# Patient Record
Sex: FEMALE
[De-identification: names, ages, dates, MRNs, and addresses within clinical notes are randomized per-mention and may not be internally consistent; named-entity substitution may affect disease eponyms.]

---

## 2020-11-11 ENCOUNTER — RESULT REVIEW (OUTPATIENT)
Age: 30
End: 2020-11-11

## 2021-04-05 ENCOUNTER — APPOINTMENT (OUTPATIENT)
Dept: ANTEPARTUM | Facility: CLINIC | Age: 31
End: 2021-04-05
Payer: COMMERCIAL

## 2021-04-05 ENCOUNTER — ASOB RESULT (OUTPATIENT)
Age: 31
End: 2021-04-05

## 2021-04-05 PROCEDURE — 76816 OB US FOLLOW-UP PER FETUS: CPT

## 2021-04-05 PROCEDURE — 76819 FETAL BIOPHYS PROFIL W/O NST: CPT

## 2021-04-05 PROCEDURE — 99072 ADDL SUPL MATRL&STAF TM PHE: CPT

## 2021-04-06 ENCOUNTER — TRANSCRIPTION ENCOUNTER (OUTPATIENT)
Age: 31
End: 2021-04-06

## 2021-05-03 ENCOUNTER — ASOB RESULT (OUTPATIENT)
Age: 31
End: 2021-05-03

## 2021-05-03 ENCOUNTER — APPOINTMENT (OUTPATIENT)
Dept: ANTEPARTUM | Facility: CLINIC | Age: 31
End: 2021-05-03
Payer: COMMERCIAL

## 2021-05-03 PROCEDURE — 99072 ADDL SUPL MATRL&STAF TM PHE: CPT

## 2021-05-03 PROCEDURE — 76816 OB US FOLLOW-UP PER FETUS: CPT

## 2021-05-03 PROCEDURE — 76819 FETAL BIOPHYS PROFIL W/O NST: CPT

## 2021-06-01 ENCOUNTER — ASOB RESULT (OUTPATIENT)
Age: 31
End: 2021-06-01

## 2021-06-01 ENCOUNTER — APPOINTMENT (OUTPATIENT)
Dept: ANTEPARTUM | Facility: CLINIC | Age: 31
End: 2021-06-01
Payer: COMMERCIAL

## 2021-06-01 PROCEDURE — 76816 OB US FOLLOW-UP PER FETUS: CPT

## 2021-06-01 PROCEDURE — 99072 ADDL SUPL MATRL&STAF TM PHE: CPT

## 2021-06-01 PROCEDURE — 76817 TRANSVAGINAL US OBSTETRIC: CPT

## 2021-06-08 ENCOUNTER — ASOB RESULT (OUTPATIENT)
Age: 31
End: 2021-06-08

## 2021-06-08 ENCOUNTER — APPOINTMENT (OUTPATIENT)
Dept: ANTEPARTUM | Facility: CLINIC | Age: 31
End: 2021-06-08
Payer: COMMERCIAL

## 2021-06-08 PROCEDURE — 76818 FETAL BIOPHYS PROFILE W/NST: CPT

## 2021-06-08 PROCEDURE — 76816 OB US FOLLOW-UP PER FETUS: CPT

## 2021-06-08 PROCEDURE — 99072 ADDL SUPL MATRL&STAF TM PHE: CPT

## 2021-06-15 ENCOUNTER — APPOINTMENT (OUTPATIENT)
Dept: ANTEPARTUM | Facility: CLINIC | Age: 31
End: 2021-06-15
Payer: COMMERCIAL

## 2021-06-15 ENCOUNTER — ASOB RESULT (OUTPATIENT)
Age: 31
End: 2021-06-15

## 2021-06-15 PROCEDURE — 76816 OB US FOLLOW-UP PER FETUS: CPT

## 2021-06-15 PROCEDURE — 99072 ADDL SUPL MATRL&STAF TM PHE: CPT

## 2021-06-15 PROCEDURE — 76819 FETAL BIOPHYS PROFIL W/O NST: CPT

## 2021-06-22 ENCOUNTER — ASOB RESULT (OUTPATIENT)
Age: 31
End: 2021-06-22

## 2021-06-22 ENCOUNTER — APPOINTMENT (OUTPATIENT)
Dept: ANTEPARTUM | Facility: CLINIC | Age: 31
End: 2021-06-22
Payer: COMMERCIAL

## 2021-06-22 PROCEDURE — 99072 ADDL SUPL MATRL&STAF TM PHE: CPT

## 2021-06-22 PROCEDURE — 76816 OB US FOLLOW-UP PER FETUS: CPT

## 2021-06-22 PROCEDURE — 76819 FETAL BIOPHYS PROFIL W/O NST: CPT

## 2021-06-29 ENCOUNTER — APPOINTMENT (OUTPATIENT)
Dept: ANTEPARTUM | Facility: CLINIC | Age: 31
End: 2021-06-29
Payer: COMMERCIAL

## 2021-06-29 ENCOUNTER — ASOB RESULT (OUTPATIENT)
Age: 31
End: 2021-06-29

## 2021-06-29 PROCEDURE — 76816 OB US FOLLOW-UP PER FETUS: CPT

## 2021-06-29 PROCEDURE — 99072 ADDL SUPL MATRL&STAF TM PHE: CPT

## 2021-06-29 PROCEDURE — 76819 FETAL BIOPHYS PROFIL W/O NST: CPT

## 2021-07-02 ENCOUNTER — INPATIENT (INPATIENT)
Facility: HOSPITAL | Age: 31
LOS: 2 days | Discharge: HOME | End: 2021-07-05
Attending: OBSTETRICS & GYNECOLOGY | Admitting: OBSTETRICS & GYNECOLOGY
Payer: COMMERCIAL

## 2021-07-02 VITALS — WEIGHT: 119.05 LBS | HEIGHT: 61 IN

## 2021-07-02 LAB
BASOPHILS # BLD AUTO: 0.02 K/UL — SIGNIFICANT CHANGE UP (ref 0–0.2)
BASOPHILS NFR BLD AUTO: 0.3 % — SIGNIFICANT CHANGE UP (ref 0–2)
BLD GP AB SCN SERPL QL: NEGATIVE — SIGNIFICANT CHANGE UP
COVID-19 SPIKE DOMAIN AB INTERP: POSITIVE
COVID-19 SPIKE DOMAIN ANTIBODY RESULT: >250 U/ML — HIGH
EOSINOPHIL # BLD AUTO: 0.05 K/UL — SIGNIFICANT CHANGE UP (ref 0–0.5)
EOSINOPHIL NFR BLD AUTO: 0.6 % — SIGNIFICANT CHANGE UP (ref 0–6)
HCT VFR BLD CALC: 32.1 % — LOW (ref 34.5–45)
HGB BLD-MCNC: 10.8 G/DL — LOW (ref 11.5–15.5)
IMM GRANULOCYTES NFR BLD AUTO: 0.9 % — SIGNIFICANT CHANGE UP (ref 0–1.5)
LYMPHOCYTES # BLD AUTO: 1.72 K/UL — SIGNIFICANT CHANGE UP (ref 1–3.3)
LYMPHOCYTES # BLD AUTO: 22.3 % — SIGNIFICANT CHANGE UP (ref 13–44)
MCHC RBC-ENTMCNC: 29 PG — SIGNIFICANT CHANGE UP (ref 27–34)
MCHC RBC-ENTMCNC: 33.6 GM/DL — SIGNIFICANT CHANGE UP (ref 32–36)
MCV RBC AUTO: 86.1 FL — SIGNIFICANT CHANGE UP (ref 80–100)
MONOCYTES # BLD AUTO: 0.52 K/UL — SIGNIFICANT CHANGE UP (ref 0–0.9)
MONOCYTES NFR BLD AUTO: 6.7 % — SIGNIFICANT CHANGE UP (ref 2–14)
NEUTROPHILS # BLD AUTO: 5.34 K/UL — SIGNIFICANT CHANGE UP (ref 1.8–7.4)
NEUTROPHILS NFR BLD AUTO: 69.2 % — SIGNIFICANT CHANGE UP (ref 43–77)
NRBC # BLD: 0 /100 WBCS — SIGNIFICANT CHANGE UP (ref 0–0)
PLATELET # BLD AUTO: 177 K/UL — SIGNIFICANT CHANGE UP (ref 150–400)
RBC # BLD: 3.73 M/UL — LOW (ref 3.8–5.2)
RBC # FLD: 15.9 % — HIGH (ref 10.3–14.5)
RH IG SCN BLD-IMP: POSITIVE — SIGNIFICANT CHANGE UP
RH IG SCN BLD-IMP: POSITIVE — SIGNIFICANT CHANGE UP
SARS-COV-2 IGG+IGM SERPL QL IA: >250 U/ML — HIGH
SARS-COV-2 IGG+IGM SERPL QL IA: POSITIVE
SARS-COV-2 RNA SPEC QL NAA+PROBE: SIGNIFICANT CHANGE UP
T PALLIDUM AB TITR SER: NEGATIVE — SIGNIFICANT CHANGE UP
WBC # BLD: 7.72 K/UL — SIGNIFICANT CHANGE UP (ref 3.8–10.5)
WBC # FLD AUTO: 7.72 K/UL — SIGNIFICANT CHANGE UP (ref 3.8–10.5)

## 2021-07-02 RX ORDER — AMPICILLIN TRIHYDRATE 250 MG
1 CAPSULE ORAL EVERY 4 HOURS
Refills: 0 | Status: DISCONTINUED | OUTPATIENT
Start: 2021-07-02 | End: 2021-07-03

## 2021-07-02 RX ORDER — OXYTOCIN 10 UNIT/ML
333.33 VIAL (ML) INJECTION
Qty: 20 | Refills: 0 | Status: COMPLETED | OUTPATIENT
Start: 2021-07-02 | End: 2021-07-02

## 2021-07-02 RX ORDER — AMPICILLIN TRIHYDRATE 250 MG
2 CAPSULE ORAL ONCE
Refills: 0 | Status: COMPLETED | OUTPATIENT
Start: 2021-07-02 | End: 2021-07-02

## 2021-07-02 RX ORDER — OXYTOCIN 10 UNIT/ML
2 VIAL (ML) INJECTION
Qty: 30 | Refills: 0 | Status: DISCONTINUED | OUTPATIENT
Start: 2021-07-02 | End: 2021-07-05

## 2021-07-02 RX ORDER — SODIUM CHLORIDE 9 MG/ML
1000 INJECTION, SOLUTION INTRAVENOUS
Refills: 0 | Status: DISCONTINUED | OUTPATIENT
Start: 2021-07-02 | End: 2021-07-03

## 2021-07-02 RX ORDER — FENTANYL/BUPIVACAINE/NS/PF 2MCG/ML-.1
250 PLASTIC BAG, INJECTION (ML) INJECTION
Refills: 0 | Status: DISCONTINUED | OUTPATIENT
Start: 2021-07-02 | End: 2021-07-02

## 2021-07-02 RX ADMIN — Medication 108 GRAM(S): at 17:10

## 2021-07-02 RX ADMIN — SODIUM CHLORIDE 125 MILLILITER(S): 9 INJECTION, SOLUTION INTRAVENOUS at 20:22

## 2021-07-02 RX ADMIN — Medication 108 GRAM(S): at 21:15

## 2021-07-02 RX ADMIN — Medication 2 MILLIUNIT(S)/MIN: at 14:30

## 2021-07-02 RX ADMIN — Medication 216 GRAM(S): at 11:30

## 2021-07-03 ENCOUNTER — RESULT REVIEW (OUTPATIENT)
Age: 31
End: 2021-07-03

## 2021-07-03 PROCEDURE — 88307 TISSUE EXAM BY PATHOLOGIST: CPT | Mod: 26

## 2021-07-03 RX ORDER — SIMETHICONE 80 MG/1
80 TABLET, CHEWABLE ORAL EVERY 4 HOURS
Refills: 0 | Status: DISCONTINUED | OUTPATIENT
Start: 2021-07-03 | End: 2021-07-05

## 2021-07-03 RX ORDER — IBUPROFEN 200 MG
600 TABLET ORAL EVERY 6 HOURS
Refills: 0 | Status: COMPLETED | OUTPATIENT
Start: 2021-07-03 | End: 2022-06-01

## 2021-07-03 RX ORDER — ACETAMINOPHEN 500 MG
975 TABLET ORAL
Refills: 0 | Status: DISCONTINUED | OUTPATIENT
Start: 2021-07-03 | End: 2021-07-05

## 2021-07-03 RX ORDER — MAGNESIUM HYDROXIDE 400 MG/1
30 TABLET, CHEWABLE ORAL
Refills: 0 | Status: DISCONTINUED | OUTPATIENT
Start: 2021-07-03 | End: 2021-07-05

## 2021-07-03 RX ORDER — KETOROLAC TROMETHAMINE 30 MG/ML
30 SYRINGE (ML) INJECTION ONCE
Refills: 0 | Status: DISCONTINUED | OUTPATIENT
Start: 2021-07-03 | End: 2021-07-03

## 2021-07-03 RX ORDER — TETANUS TOXOID, REDUCED DIPHTHERIA TOXOID AND ACELLULAR PERTUSSIS VACCINE, ADSORBED 5; 2.5; 8; 8; 2.5 [IU]/.5ML; [IU]/.5ML; UG/.5ML; UG/.5ML; UG/.5ML
0.5 SUSPENSION INTRAMUSCULAR ONCE
Refills: 0 | Status: DISCONTINUED | OUTPATIENT
Start: 2021-07-03 | End: 2021-07-05

## 2021-07-03 RX ORDER — BENZOCAINE 10 %
1 GEL (GRAM) MUCOUS MEMBRANE EVERY 6 HOURS
Refills: 0 | Status: DISCONTINUED | OUTPATIENT
Start: 2021-07-03 | End: 2021-07-05

## 2021-07-03 RX ORDER — OXYTOCIN 10 UNIT/ML
333.33 VIAL (ML) INJECTION
Qty: 20 | Refills: 0 | Status: DISCONTINUED | OUTPATIENT
Start: 2021-07-03 | End: 2021-07-05

## 2021-07-03 RX ORDER — FERROUS SULFATE 325(65) MG
325 TABLET ORAL EVERY 12 HOURS
Refills: 0 | Status: DISCONTINUED | OUTPATIENT
Start: 2021-07-03 | End: 2021-07-05

## 2021-07-03 RX ORDER — DIPHENHYDRAMINE HCL 50 MG
25 CAPSULE ORAL EVERY 6 HOURS
Refills: 0 | Status: DISCONTINUED | OUTPATIENT
Start: 2021-07-03 | End: 2021-07-05

## 2021-07-03 RX ORDER — OXYCODONE HYDROCHLORIDE 5 MG/1
5 TABLET ORAL
Refills: 0 | Status: DISCONTINUED | OUTPATIENT
Start: 2021-07-03 | End: 2021-07-05

## 2021-07-03 RX ORDER — FLUOXETINE HCL 10 MG
20 CAPSULE ORAL DAILY
Refills: 0 | Status: DISCONTINUED | OUTPATIENT
Start: 2021-07-03 | End: 2021-07-05

## 2021-07-03 RX ORDER — PRAMOXINE HYDROCHLORIDE 150 MG/15G
1 AEROSOL, FOAM RECTAL EVERY 4 HOURS
Refills: 0 | Status: DISCONTINUED | OUTPATIENT
Start: 2021-07-03 | End: 2021-07-05

## 2021-07-03 RX ORDER — IBUPROFEN 200 MG
600 TABLET ORAL EVERY 6 HOURS
Refills: 0 | Status: DISCONTINUED | OUTPATIENT
Start: 2021-07-03 | End: 2021-07-05

## 2021-07-03 RX ORDER — SODIUM CHLORIDE 9 MG/ML
3 INJECTION INTRAMUSCULAR; INTRAVENOUS; SUBCUTANEOUS EVERY 8 HOURS
Refills: 0 | Status: DISCONTINUED | OUTPATIENT
Start: 2021-07-03 | End: 2021-07-05

## 2021-07-03 RX ORDER — LANOLIN
1 OINTMENT (GRAM) TOPICAL EVERY 6 HOURS
Refills: 0 | Status: DISCONTINUED | OUTPATIENT
Start: 2021-07-03 | End: 2021-07-05

## 2021-07-03 RX ORDER — HYDROCORTISONE 1 %
1 OINTMENT (GRAM) TOPICAL EVERY 6 HOURS
Refills: 0 | Status: DISCONTINUED | OUTPATIENT
Start: 2021-07-03 | End: 2021-07-05

## 2021-07-03 RX ORDER — DIBUCAINE 1 %
1 OINTMENT (GRAM) RECTAL EVERY 6 HOURS
Refills: 0 | Status: DISCONTINUED | OUTPATIENT
Start: 2021-07-03 | End: 2021-07-05

## 2021-07-03 RX ORDER — AER TRAVELER 0.5 G/1
1 SOLUTION RECTAL; TOPICAL EVERY 4 HOURS
Refills: 0 | Status: DISCONTINUED | OUTPATIENT
Start: 2021-07-03 | End: 2021-07-05

## 2021-07-03 RX ORDER — OXYCODONE HYDROCHLORIDE 5 MG/1
5 TABLET ORAL ONCE
Refills: 0 | Status: DISCONTINUED | OUTPATIENT
Start: 2021-07-03 | End: 2021-07-05

## 2021-07-03 RX ADMIN — Medication 108 GRAM(S): at 02:31

## 2021-07-03 RX ADMIN — Medication 975 MILLIGRAM(S): at 15:23

## 2021-07-03 RX ADMIN — Medication 1 APPLICATION(S): at 09:18

## 2021-07-03 RX ADMIN — SODIUM CHLORIDE 3 MILLILITER(S): 9 INJECTION INTRAMUSCULAR; INTRAVENOUS; SUBCUTANEOUS at 22:00

## 2021-07-03 RX ADMIN — SODIUM CHLORIDE 3 MILLILITER(S): 9 INJECTION INTRAMUSCULAR; INTRAVENOUS; SUBCUTANEOUS at 12:21

## 2021-07-03 RX ADMIN — Medication 325 MILLIGRAM(S): at 21:51

## 2021-07-03 RX ADMIN — Medication 975 MILLIGRAM(S): at 10:00

## 2021-07-03 RX ADMIN — Medication 20 MILLIGRAM(S): at 12:21

## 2021-07-03 RX ADMIN — Medication 1 SPRAY(S): at 09:18

## 2021-07-03 RX ADMIN — Medication 30 MILLIGRAM(S): at 05:21

## 2021-07-03 RX ADMIN — Medication 600 MILLIGRAM(S): at 18:30

## 2021-07-03 RX ADMIN — Medication 600 MILLIGRAM(S): at 12:20

## 2021-07-03 RX ADMIN — Medication 1 TABLET(S): at 09:18

## 2021-07-03 RX ADMIN — Medication 1000 MILLIUNIT(S)/MIN: at 03:51

## 2021-07-03 RX ADMIN — Medication 975 MILLIGRAM(S): at 21:15

## 2021-07-03 RX ADMIN — Medication 600 MILLIGRAM(S): at 13:00

## 2021-07-03 RX ADMIN — Medication 975 MILLIGRAM(S): at 22:00

## 2021-07-03 RX ADMIN — Medication 975 MILLIGRAM(S): at 16:00

## 2021-07-03 RX ADMIN — Medication 600 MILLIGRAM(S): at 17:46

## 2021-07-03 RX ADMIN — Medication 975 MILLIGRAM(S): at 09:18

## 2021-07-03 NOTE — LACTATION INITIAL EVALUATION - NIPPLE ASSESSMENT (LEFT)
dense areolas, edematous , reverse pressure softening softens areola and nipple everts slightly/medium/flat/compressible

## 2021-07-03 NOTE — LACTATION INITIAL EVALUATION - NIPPLE ASSESSMENT (RIGHT)
areola also dense, reverse pressure softening softens areola and with stimualtion nipple everts slightly/medium/flat/compressible

## 2021-07-03 NOTE — LACTATION INITIAL EVALUATION - MILK SUPPLY
difficult to hand express due to pt.'s sensitivity , instructed pt. on use of hand pump although uncomfortable pt. able to pump approx. 3minutes and 0.6 ml of colostrum expressed/colostrum

## 2021-07-03 NOTE — PROVIDER CONTACT NOTE (OTHER) - BACKGROUND
S/p NSD on 7/3 at 0252, , 38.6 weeks gestation. Hx of anemia during pregancy; iron IV infusion 7x. H&H predelivery: 10.8/32.1

## 2021-07-03 NOTE — LACTATION INITIAL EVALUATION - LACTATION INTERVENTIONS
unable to sustain latch/initiate/review safe skin-to-skin/initiate/review hand expression/initiate/review pumping guidelines and safe milk handling/reverse pressure softening/initiate/review techniques for position and latch/initiate/review breast massage/compression/reviewed feeding on demand/by cue at least 8 times a day/reviewed indications of inadequate milk transfer that would require supplementation

## 2021-07-03 NOTE — LACTATION INITIAL EVALUATION - NS LACT CON REASON FOR REQ
Pt. is a p1 at 38.6 wks. gestation via vaginal delivery.  Pt has hx of depression/anxiety has used Prozac and stopped when pregnant resumed use of Prozac approx. 1 month ago.  Pt.'s areolas dense but with reverse pressure softening edema noted and areolas did soften slightly.  Instructed pt. on use of hand pump due to difficulty with hand expressing and pt. did state breasts are sensitive.  Pt. was able to tolerate hand pump for approx. 3 minutes and was able to express 0.6 ml of colostrum.  Colostrum was then fed via syringe to baby.   prioor to using hand pump attempted to latch baby to breast but baby too sleepy to latch.  Discussed with pt. possibility of triple feeding plan if baby remains to sleepy to latch or is not latching.  Pt. to be followed up by LC./primaparous mom Pt. is a p1 at 38.6 wks. gestation via vaginal delivery.  Pt has hx of depression/anxiety has used Prozac and stopped when pregnant resumed use of Prozac approx. 1 month ago.  Pt.'s areolas dense but with reverse pressure softening edema noted and areolas did soften slightly.  Instructed pt. on use of hand pump due to difficulty with hand expressing and pt. did state breasts are sensitive.  Pt. was able to tolerate hand pump for approx. 3 minutes and was able to express 0.6 ml of colostrum.  Colostrum was then fed via syringe to baby.   Prior to using hand pump attempted to latch baby to breast but baby too sleepy to latch.  Discussed with pt. possibility of triple feeding plan if baby remains to sleepy to latch or is not latching.  Pt. to be followed up by LC./primaparous mom

## 2021-07-04 RX ORDER — POLYETHYLENE GLYCOL 3350 17 G/17G
17 POWDER, FOR SOLUTION ORAL EVERY 24 HOURS
Refills: 0 | Status: DISCONTINUED | OUTPATIENT
Start: 2021-07-04 | End: 2021-07-05

## 2021-07-04 RX ADMIN — Medication 20 MILLIGRAM(S): at 12:19

## 2021-07-04 RX ADMIN — Medication 975 MILLIGRAM(S): at 16:30

## 2021-07-04 RX ADMIN — Medication 600 MILLIGRAM(S): at 02:20

## 2021-07-04 RX ADMIN — Medication 600 MILLIGRAM(S): at 18:15

## 2021-07-04 RX ADMIN — SODIUM CHLORIDE 3 MILLILITER(S): 9 INJECTION INTRAMUSCULAR; INTRAVENOUS; SUBCUTANEOUS at 06:22

## 2021-07-04 RX ADMIN — Medication 975 MILLIGRAM(S): at 08:57

## 2021-07-04 RX ADMIN — Medication 600 MILLIGRAM(S): at 06:21

## 2021-07-04 RX ADMIN — Medication 1 TABLET(S): at 12:18

## 2021-07-04 RX ADMIN — Medication 600 MILLIGRAM(S): at 17:48

## 2021-07-04 RX ADMIN — Medication 975 MILLIGRAM(S): at 10:00

## 2021-07-04 RX ADMIN — Medication 600 MILLIGRAM(S): at 23:53

## 2021-07-04 RX ADMIN — Medication 975 MILLIGRAM(S): at 03:09

## 2021-07-04 RX ADMIN — POLYETHYLENE GLYCOL 3350 17 GRAM(S): 17 POWDER, FOR SOLUTION ORAL at 06:26

## 2021-07-04 RX ADMIN — Medication 600 MILLIGRAM(S): at 12:18

## 2021-07-04 RX ADMIN — Medication 600 MILLIGRAM(S): at 07:12

## 2021-07-04 RX ADMIN — Medication 325 MILLIGRAM(S): at 08:57

## 2021-07-04 RX ADMIN — Medication 975 MILLIGRAM(S): at 15:40

## 2021-07-04 RX ADMIN — Medication 975 MILLIGRAM(S): at 05:00

## 2021-07-04 RX ADMIN — Medication 600 MILLIGRAM(S): at 01:21

## 2021-07-04 RX ADMIN — Medication 600 MILLIGRAM(S): at 13:00

## 2021-07-04 RX ADMIN — Medication 975 MILLIGRAM(S): at 22:00

## 2021-07-04 RX ADMIN — Medication 975 MILLIGRAM(S): at 21:13

## 2021-07-05 ENCOUNTER — TRANSCRIPTION ENCOUNTER (OUTPATIENT)
Age: 31
End: 2021-07-05

## 2021-07-05 VITALS
TEMPERATURE: 99 F | HEART RATE: 71 BPM | DIASTOLIC BLOOD PRESSURE: 61 MMHG | OXYGEN SATURATION: 97 % | RESPIRATION RATE: 18 BRPM | SYSTOLIC BLOOD PRESSURE: 97 MMHG

## 2021-07-05 PROCEDURE — U0005: CPT

## 2021-07-05 PROCEDURE — 86769 SARS-COV-2 COVID-19 ANTIBODY: CPT

## 2021-07-05 PROCEDURE — 86901 BLOOD TYPING SEROLOGIC RH(D): CPT

## 2021-07-05 PROCEDURE — U0003: CPT

## 2021-07-05 PROCEDURE — 36415 COLL VENOUS BLD VENIPUNCTURE: CPT

## 2021-07-05 PROCEDURE — 86780 TREPONEMA PALLIDUM: CPT

## 2021-07-05 PROCEDURE — 86900 BLOOD TYPING SEROLOGIC ABO: CPT

## 2021-07-05 PROCEDURE — 86850 RBC ANTIBODY SCREEN: CPT

## 2021-07-05 PROCEDURE — 59050 FETAL MONITOR W/REPORT: CPT

## 2021-07-05 PROCEDURE — 85025 COMPLETE CBC W/AUTO DIFF WBC: CPT

## 2021-07-05 PROCEDURE — 88307 TISSUE EXAM BY PATHOLOGIST: CPT

## 2021-07-05 RX ORDER — ZINC OXIDE 200 MG/G
1 OINTMENT TOPICAL
Refills: 0 | Status: DISCONTINUED | OUTPATIENT
Start: 2021-07-05 | End: 2021-07-05

## 2021-07-05 RX ORDER — IBUPROFEN 200 MG
1 TABLET ORAL
Qty: 0 | Refills: 0 | DISCHARGE
Start: 2021-07-05

## 2021-07-05 RX ORDER — ACETAMINOPHEN 500 MG
3 TABLET ORAL
Qty: 0 | Refills: 0 | DISCHARGE
Start: 2021-07-05

## 2021-07-05 RX ORDER — ZINC OXIDE 200 MG/G
1 OINTMENT TOPICAL
Qty: 0 | Refills: 0 | DISCHARGE
Start: 2021-07-05

## 2021-07-05 RX ADMIN — Medication 600 MILLIGRAM(S): at 01:30

## 2021-07-05 RX ADMIN — Medication 975 MILLIGRAM(S): at 10:16

## 2021-07-05 RX ADMIN — Medication 975 MILLIGRAM(S): at 05:00

## 2021-07-05 RX ADMIN — Medication 20 MILLIGRAM(S): at 12:36

## 2021-07-05 RX ADMIN — Medication 325 MILLIGRAM(S): at 06:37

## 2021-07-05 RX ADMIN — ZINC OXIDE 1 APPLICATION(S): 200 OINTMENT TOPICAL at 13:35

## 2021-07-05 RX ADMIN — Medication 600 MILLIGRAM(S): at 13:17

## 2021-07-05 RX ADMIN — Medication 975 MILLIGRAM(S): at 04:13

## 2021-07-05 RX ADMIN — Medication 600 MILLIGRAM(S): at 06:37

## 2021-07-05 RX ADMIN — Medication 1 TABLET(S): at 12:36

## 2021-07-05 RX ADMIN — Medication 1 APPLICATION(S): at 13:35

## 2021-07-05 RX ADMIN — Medication 975 MILLIGRAM(S): at 09:46

## 2021-07-05 RX ADMIN — Medication 600 MILLIGRAM(S): at 07:30

## 2021-07-05 RX ADMIN — Medication 600 MILLIGRAM(S): at 12:37

## 2021-07-05 NOTE — PROGRESS NOTE ADULT - ATTENDING COMMENTS
Patient seen and examined at bedside. Patient has no current complaints. Patient to be discharged home today, patient to follow up in office in 6w

## 2021-07-05 NOTE — DISCHARGE NOTE OB - PATIENT PORTAL LINK FT
You can access the FollowMyHealth Patient Portal offered by [facility] by registering at the following website: [email or web address]. By joining [employer]’s FollowMyHealth portal, you will also be able to view your health information using other applications (apps) compatible with our system.

## 2021-07-05 NOTE — DISCHARGE NOTE OB - CARE PLAN
Principal Discharge DX:	Postpartum state  Goal:	safe discharge home  Assessment and plan of treatment:	routine postpartum care

## 2021-07-05 NOTE — PROGRESS NOTE ADULT - ASSESSMENT
A/P 31y s/p , PPD # 2, stable, meeting postpartum milestones   - Skin irritation: appears to be scratches/irritation from postpartum underwear, continue to monitor  - Pain: well controlled on tylenol/motrin  - GI: Tolerating regular diet  - : urinating without difficulty/pain  -DVT prophylaxis: ambulating frequently  -Dispo: PPD 2, unless otherwise specified    
A/P 31y s/p , PPD # 1, stable, meeting postpartum milestones   - SOB w/ ambulation: continue oral iron 325mg bid. If symptoms persist, consider repeat CBC  - Pain: well controlled on tylenol/motrin  - GI: Tolerating regular diet  - : urinating without difficulty/pain  -DVT prophylaxis: ambulating frequently  -Dispo: PPD 2, unless otherwise specified

## 2021-07-05 NOTE — DISCHARGE NOTE OB - CARE PROVIDER_API CALL
Susan Gross (DO; MS)  [facility]  [facility] [address] [place] [address]  Phone: [phone]  Fax: [phone]  Follow Up Time:

## 2021-07-05 NOTE — DISCHARGE NOTE OB - MEDICATION SUMMARY - MEDICATIONS TO TAKE
I will START or STAY ON the medications listed below when I get home from the hospital:    acetaminophen 325 mg oral tablet  -- 3 tab(s) by mouth   -- Indication: For Pain    ibuprofen 600 mg oral tablet  -- 1 tab(s) by mouth every 6 hours  -- Indication: For Pain    Prenatal Multivitamins with Folic Acid 1 mg oral tablet  -- 1 tab(s) by mouth once a day  -- Indication: For Postpartum state

## 2021-07-05 NOTE — DISCHARGE NOTE OB - MEDICATION SUMMARY - MEDICATIONS TO CHANGE
Health Maintenance Due   Topic Date Due   • Osteoporosis Screening  10/16/2012   • Medicare Wellness 65+  04/26/2019   • Depression Screening  04/26/2019       Patient is due for the topics as listed above and wishes to discuss with provider. Medicare Wellness Visit and Depression Screening completed today. Patient informed that she needs to be up to date with an osteoporosis screening.     I will SWITCH the dose or number of times a day I take the medications listed below when I get home from the hospital:  None

## 2021-07-06 PROBLEM — Z00.00 ENCOUNTER FOR PREVENTIVE HEALTH EXAMINATION: Status: ACTIVE | Noted: 2021-07-06

## 2021-07-09 ENCOUNTER — APPOINTMENT (OUTPATIENT)
Dept: ANTEPARTUM | Facility: CLINIC | Age: 31
End: 2021-07-09

## 2021-07-13 DIAGNOSIS — O34.13 MATERNAL CARE FOR BENIGN TUMOR OF CORPUS UTERI, THIRD TRIMESTER: ICD-10-CM

## 2021-07-13 DIAGNOSIS — Z28.09 IMMUNIZATION NOT CARRIED OUT BECAUSE OF OTHER CONTRAINDICATION: ICD-10-CM

## 2021-07-13 DIAGNOSIS — Z3A.38 38 WEEKS GESTATION OF PREGNANCY: ICD-10-CM

## 2021-07-13 DIAGNOSIS — D25.9 LEIOMYOMA OF UTERUS, UNSPECIFIED: ICD-10-CM

## 2021-10-19 ENCOUNTER — EMERGENCY (EMERGENCY)
Facility: HOSPITAL | Age: 31
LOS: 1 days | Discharge: HOME | End: 2021-10-19
Attending: EMERGENCY MEDICINE | Admitting: EMERGENCY MEDICINE
Payer: MEDICARE

## 2021-10-19 VITALS
HEART RATE: 76 BPM | TEMPERATURE: 98 F | WEIGHT: 123.46 LBS | DIASTOLIC BLOOD PRESSURE: 82 MMHG | OXYGEN SATURATION: 98 % | RESPIRATION RATE: 16 BRPM | SYSTOLIC BLOOD PRESSURE: 125 MMHG | HEIGHT: 61 IN

## 2021-10-19 DIAGNOSIS — N83.202 UNSPECIFIED OVARIAN CYST, LEFT SIDE: ICD-10-CM

## 2021-10-19 DIAGNOSIS — D21.9 BENIGN NEOPLASM OF CONNECTIVE AND OTHER SOFT TISSUE, UNSPECIFIED: ICD-10-CM

## 2021-10-19 DIAGNOSIS — Z20.822 CONTACT WITH AND (SUSPECTED) EXPOSURE TO COVID-19: ICD-10-CM

## 2021-10-19 DIAGNOSIS — Z88.2 ALLERGY STATUS TO SULFONAMIDES: ICD-10-CM

## 2021-10-19 DIAGNOSIS — Z88.1 ALLERGY STATUS TO OTHER ANTIBIOTIC AGENTS STATUS: ICD-10-CM

## 2021-10-19 DIAGNOSIS — R10.31 RIGHT LOWER QUADRANT PAIN: ICD-10-CM

## 2021-10-19 LAB
ALBUMIN SERPL ELPH-MCNC: 4.5 G/DL — SIGNIFICANT CHANGE UP (ref 3.3–5)
ALP SERPL-CCNC: 66 U/L — SIGNIFICANT CHANGE UP (ref 40–120)
ALT FLD-CCNC: 22 U/L — SIGNIFICANT CHANGE UP (ref 10–45)
ANION GAP SERPL CALC-SCNC: 11 MMOL/L — SIGNIFICANT CHANGE UP (ref 5–17)
APPEARANCE UR: CLEAR — SIGNIFICANT CHANGE UP
APTT BLD: 29.3 SEC — SIGNIFICANT CHANGE UP (ref 27.5–35.5)
AST SERPL-CCNC: 19 U/L — SIGNIFICANT CHANGE UP (ref 10–40)
BACTERIA # UR AUTO: SIGNIFICANT CHANGE UP /HPF
BASOPHILS # BLD AUTO: 0.02 K/UL — SIGNIFICANT CHANGE UP (ref 0–0.2)
BASOPHILS NFR BLD AUTO: 0.3 % — SIGNIFICANT CHANGE UP (ref 0–2)
BILIRUB SERPL-MCNC: 0.2 MG/DL — SIGNIFICANT CHANGE UP (ref 0.2–1.2)
BILIRUB UR-MCNC: NEGATIVE — SIGNIFICANT CHANGE UP
BUN SERPL-MCNC: 17 MG/DL — SIGNIFICANT CHANGE UP (ref 7–23)
CALCIUM SERPL-MCNC: 9.8 MG/DL — SIGNIFICANT CHANGE UP (ref 8.4–10.5)
CHLORIDE SERPL-SCNC: 105 MMOL/L — SIGNIFICANT CHANGE UP (ref 96–108)
CO2 SERPL-SCNC: 25 MMOL/L — SIGNIFICANT CHANGE UP (ref 22–31)
COLOR SPEC: YELLOW — SIGNIFICANT CHANGE UP
CREAT SERPL-MCNC: 0.59 MG/DL — SIGNIFICANT CHANGE UP (ref 0.5–1.3)
DIFF PNL FLD: NEGATIVE — SIGNIFICANT CHANGE UP
EOSINOPHIL # BLD AUTO: 0.23 K/UL — SIGNIFICANT CHANGE UP (ref 0–0.5)
EOSINOPHIL NFR BLD AUTO: 3.2 % — SIGNIFICANT CHANGE UP (ref 0–6)
EPI CELLS # UR: SIGNIFICANT CHANGE UP /HPF (ref 0–5)
GLUCOSE SERPL-MCNC: 93 MG/DL — SIGNIFICANT CHANGE UP (ref 70–99)
GLUCOSE UR QL: NEGATIVE — SIGNIFICANT CHANGE UP
HCG UR QL: NEGATIVE — SIGNIFICANT CHANGE UP
HCT VFR BLD CALC: 34.4 % — LOW (ref 34.5–45)
HGB BLD-MCNC: 11.7 G/DL — SIGNIFICANT CHANGE UP (ref 11.5–15.5)
IMM GRANULOCYTES NFR BLD AUTO: 0.3 % — SIGNIFICANT CHANGE UP (ref 0–1.5)
INR BLD: 1.03 — SIGNIFICANT CHANGE UP (ref 0.88–1.16)
KETONES UR-MCNC: NEGATIVE — SIGNIFICANT CHANGE UP
LEUKOCYTE ESTERASE UR-ACNC: ABNORMAL
LYMPHOCYTES # BLD AUTO: 2.82 K/UL — SIGNIFICANT CHANGE UP (ref 1–3.3)
LYMPHOCYTES # BLD AUTO: 38.6 % — SIGNIFICANT CHANGE UP (ref 13–44)
MCHC RBC-ENTMCNC: 29.7 PG — SIGNIFICANT CHANGE UP (ref 27–34)
MCHC RBC-ENTMCNC: 34 GM/DL — SIGNIFICANT CHANGE UP (ref 32–36)
MCV RBC AUTO: 87.3 FL — SIGNIFICANT CHANGE UP (ref 80–100)
MONOCYTES # BLD AUTO: 0.53 K/UL — SIGNIFICANT CHANGE UP (ref 0–0.9)
MONOCYTES NFR BLD AUTO: 7.3 % — SIGNIFICANT CHANGE UP (ref 2–14)
NEUTROPHILS # BLD AUTO: 3.68 K/UL — SIGNIFICANT CHANGE UP (ref 1.8–7.4)
NEUTROPHILS NFR BLD AUTO: 50.3 % — SIGNIFICANT CHANGE UP (ref 43–77)
NITRITE UR-MCNC: NEGATIVE — SIGNIFICANT CHANGE UP
NRBC # BLD: 0 /100 WBCS — SIGNIFICANT CHANGE UP (ref 0–0)
PH UR: 6 — SIGNIFICANT CHANGE UP (ref 5–8)
PLATELET # BLD AUTO: 299 K/UL — SIGNIFICANT CHANGE UP (ref 150–400)
POTASSIUM SERPL-MCNC: 3.7 MMOL/L — SIGNIFICANT CHANGE UP (ref 3.5–5.3)
POTASSIUM SERPL-SCNC: 3.7 MMOL/L — SIGNIFICANT CHANGE UP (ref 3.5–5.3)
PROT SERPL-MCNC: 7.5 G/DL — SIGNIFICANT CHANGE UP (ref 6–8.3)
PROT UR-MCNC: NEGATIVE MG/DL — SIGNIFICANT CHANGE UP
PROTHROM AB SERPL-ACNC: 12.3 SEC — SIGNIFICANT CHANGE UP (ref 10.6–13.6)
RBC # BLD: 3.94 M/UL — SIGNIFICANT CHANGE UP (ref 3.8–5.2)
RBC # FLD: 13.4 % — SIGNIFICANT CHANGE UP (ref 10.3–14.5)
RBC CASTS # UR COMP ASSIST: < 5 /HPF — SIGNIFICANT CHANGE UP
SARS-COV-2 RNA SPEC QL NAA+PROBE: NEGATIVE — SIGNIFICANT CHANGE UP
SODIUM SERPL-SCNC: 141 MMOL/L — SIGNIFICANT CHANGE UP (ref 135–145)
SP GR SPEC: 1.01 — SIGNIFICANT CHANGE UP (ref 1–1.03)
UROBILINOGEN FLD QL: 0.2 E.U./DL — SIGNIFICANT CHANGE UP
WBC # BLD: 7.3 K/UL — SIGNIFICANT CHANGE UP (ref 3.8–10.5)
WBC # FLD AUTO: 7.3 K/UL — SIGNIFICANT CHANGE UP (ref 3.8–10.5)
WBC UR QL: < 5 /HPF — SIGNIFICANT CHANGE UP

## 2021-10-19 PROCEDURE — 99285 EMERGENCY DEPT VISIT HI MDM: CPT

## 2021-10-19 PROCEDURE — 76856 US EXAM PELVIC COMPLETE: CPT | Mod: 26,59

## 2021-10-19 PROCEDURE — 76830 TRANSVAGINAL US NON-OB: CPT | Mod: 26

## 2021-10-19 RX ORDER — MORPHINE SULFATE 50 MG/1
4 CAPSULE, EXTENDED RELEASE ORAL ONCE
Refills: 0 | Status: DISCONTINUED | OUTPATIENT
Start: 2021-10-19 | End: 2021-10-19

## 2021-10-19 RX ORDER — ONDANSETRON 8 MG/1
4 TABLET, FILM COATED ORAL ONCE
Refills: 0 | Status: COMPLETED | OUTPATIENT
Start: 2021-10-19 | End: 2021-10-19

## 2021-10-19 RX ORDER — IOHEXOL 300 MG/ML
30 INJECTION, SOLUTION INTRAVENOUS ONCE
Refills: 0 | Status: COMPLETED | OUTPATIENT
Start: 2021-10-19 | End: 2021-10-19

## 2021-10-19 RX ORDER — SODIUM CHLORIDE 9 MG/ML
1000 INJECTION INTRAMUSCULAR; INTRAVENOUS; SUBCUTANEOUS ONCE
Refills: 0 | Status: COMPLETED | OUTPATIENT
Start: 2021-10-19 | End: 2021-10-19

## 2021-10-19 RX ADMIN — ONDANSETRON 4 MILLIGRAM(S): 8 TABLET, FILM COATED ORAL at 23:38

## 2021-10-19 RX ADMIN — SODIUM CHLORIDE 1000 MILLILITER(S): 9 INJECTION INTRAMUSCULAR; INTRAVENOUS; SUBCUTANEOUS at 23:47

## 2021-10-19 RX ADMIN — IOHEXOL 30 MILLILITER(S): 300 INJECTION, SOLUTION INTRAVENOUS at 23:38

## 2021-10-19 RX ADMIN — MORPHINE SULFATE 4 MILLIGRAM(S): 50 CAPSULE, EXTENDED RELEASE ORAL at 23:38

## 2021-10-19 NOTE — ED PROVIDER NOTE - SHIFT CHANGE DETAILS
31F c/o rlq abd pain. avss. labs wnl. found to have L (NOT R) ovarian hemorrhagic cyst on pelvic us. s/p ct a/p to r/o appy.    dispo: pending ct a/p read -- anticipate dc home if no acute abnl

## 2021-10-19 NOTE — ED PROVIDER NOTE - CLINICAL SUMMARY MEDICAL DECISION MAKING FREE TEXT BOX
RLQ abd pain, concern for appendicitis.  US showing fibroids and 2 left ovarian cysts, consider intermittent torsion; good flow at time of exam. CT scan showing normal appendix.  Unclear if the ovarian cysts on left side would cause this RLQ pain; consider fibroids as cause (degenerating?).

## 2021-10-19 NOTE — ED PROVIDER NOTE - OBJECTIVE STATEMENT
32 y/o F s/p child birth and  x3 months ago presents c/o constant RLQ abd pain which radiates slightly to back x yesterday. Exacerbated by walking. Notes pain has worsened today. Pain has never migrated from another part of the abd. Denies fever, chills, n/v/d, anorexia, urinary sx, abnormal vaginal d/c or bleeding or any other acute sx. No changes in BM.

## 2021-10-19 NOTE — ED ADULT NURSE NOTE - OBJECTIVE STATEMENT
pt is 31y female, here for RLQ pain since yesterday, denies any n, v, d, fevers or urinary sx, pt ambulatory with steady gait, abd soft, non distended, TTP in RLQ, NAD present

## 2021-10-19 NOTE — ED PROVIDER NOTE - PHYSICAL EXAMINATION
CONSTITUTIONAL: NAD   SKIN: Normal color and turgor.    HEAD: NC/AT.  EYES: Conjunctiva clear. EOMI. PERRL.    ENT: Airway clear. Normal voice.   RESPIRATORY:  Normal work of breathing. Lungs CTAB.  CARDIOVASCULAR:  RRR, S1S2. No M/R/G.      GI: NABS, abd soft, +TTP at McBurney's point w/ +rebound, +Rovsing's, +jar tenderness  : no CVAT b/l  MSK: Neck supple.  No lower extremity edema or calf tenderness.  No joint swelling or ROM limitation.  NEURO: Alert and oriented; CN II-XII grossly intact. Speech clear. 5/5 strength in all extremities.  Good balance. Steady gait.

## 2021-10-19 NOTE — ED PROVIDER NOTE - PATIENT PORTAL LINK FT
You can access the FollowMyHealth Patient Portal offered by [facility] by registering at the following website: [email or web address]. By joining [employer]’s FollowMyHealth portal, you will also be able to view your health information using other applications (apps) compatible with our system.

## 2021-10-19 NOTE — ED ADULT TRIAGE NOTE - CHIEF COMPLAINT QUOTE
Pt presents to ER c/o constant RLQ "6/10 cramping" pain for 1 day. Pt denies any other associated symptoms at this time.

## 2021-10-19 NOTE — ED PROVIDER NOTE - NSFOLLOWUPINSTRUCTIONS_ED_ALL_ED_FT
No breastfeeding (pump & dump) for next 24 hours.  Take NSAID (ibuprofen) if needed for pain - use as directed.    Follow up with your gynecologist - call later this morning.    Return to the Emergency Department if you have severe/recurrent pain (consider intermittent ovarian torsion, which is an emergency), any other new or worsening symptoms, or if you have any concerns.  ============================    Abdominal Pain    WHAT YOU NEED TO KNOW:    Abdominal pain can be dull, achy, or sharp. You may have pain in one area of your abdomen, or in your entire abdomen. Your pain may be caused by a condition such as constipation, food sensitivity or poisoning, infection, or a blockage. Abdominal pain can also be from a hernia, appendicitis, or an ulcer. Liver, gallbladder, or kidney conditions can also cause abdominal pain. The cause of your abdominal pain may not be known.    Abdominal Organs         DISCHARGE INSTRUCTIONS:    Call your local emergency number (911 in the ) if:   •You have new chest pain or shortness of breath.          Return to the emergency department if:   •You have pulsing pain in your upper abdomen or lower back that suddenly becomes constant.      •Your pain is in the right lower abdominal area and worsens with movement.      •You have a fever over 100.4°F (38°C) or shaking chills.      •You are vomiting and cannot keep food or liquids down.      •Your pain does not improve or gets worse over the next 8 to 12 hours.      •You see blood in your vomit or bowel movements, or they look black and tarry.      •Your skin or the whites of your eyes turn yellow.      •You are a woman and have a large amount of vaginal bleeding that is not your monthly period.      Call your doctor if:   •You have pain in your lower back.      •You are a man and have pain in your testicles.      •You have pain when you urinate.      •You have questions or concerns about your condition or care.      Medicines:   •Prescription pain medicine may be given. Ask your healthcare provider how to take this medicine safely. Some prescription pain medicines contain acetaminophen. Do not take other medicines that contain acetaminophen without talking to your healthcare provider. Too much acetaminophen may cause liver damage. Prescription pain medicine may cause constipation. Ask your healthcare provider how to prevent or treat constipation.       •Medicines may be given to calm your stomach or prevent vomiting.      •Take your medicine as directed. Contact your healthcare provider if you think your medicine is not helping or if you have side effects. Tell him of her if you are allergic to any medicine. Keep a list of the medicines, vitamins, and herbs you take. Include the amounts, and when and why you take them. Bring the list or the pill bottles to follow-up visits. Carry your medicine list with you in case of an emergency.      Manage your symptoms:   •Apply heat on your abdomen for 20 to 30 minutes every 2 hours for as many days as directed. Heat helps decrease pain and muscle spasms.      •Make changes to the food you eat, if needed. Do not eat foods that cause abdominal pain or other symptoms. Eat small meals more often. The following changes may also help:?Eat more high-fiber foods if you are constipated. High-fiber foods include fruits, vegetables, whole-grain foods, and legumes.             ?Do not eat foods that cause gas if you have bloating. Examples include broccoli, cabbage, and cauliflower. Do not drink soda or carbonated drinks. These may also cause gas.      ?Do not eat foods or drinks that contain sorbitol or fructose if you have diarrhea and bloating. Some examples are fruit juices, candy, jelly, and sugar-free gum.      ?Do not eat high-fat foods. Examples include fried foods, cheeseburgers, hot dogs, and desserts.      ?Limit or do not have caffeine. Caffeine may make symptoms such as heartburn or nausea worse.      ?Drink more liquids to prevent dehydration from diarrhea or vomiting. Ask your healthcare provider how much liquid to drink each day and which liquids are best for you.      •Keep a diary of your abdominal pain. A diary may help your healthcare provider learn what is causing your abdominal pain. Include when the pain happens, how long it lasts, and what the pain feels like. Write down any other symptoms you have with abdominal pain. Also write down what you eat, and what symptoms you have after you eat.      •Manage your stress. Stress may cause abdominal pain. Your healthcare provider may recommend relaxation techniques and deep breathing exercises to help decrease your stress. Your healthcare provider may recommend you talk to someone about your stress or anxiety, such as a counselor or a trusted friend. Get plenty of sleep and exercise regularly.  [employer] Walking for Exercise           •Limit or do not drink alcohol. Alcohol can make your abdominal pain worse. Ask your healthcare provider if it is safe for you to drink alcohol. Also ask how much is safe for you to drink.      •Do not smoke. Nicotine and other chemicals in cigarettes can damage your esophagus and stomach. Ask your healthcare provider for information if you currently smoke and need help to quit. E-cigarettes or smokeless tobacco still contain nicotine. Talk to your healthcare provider before you use these products.      Follow up with your doctor within 24 hours or as directed: Write down your questions so you remember to ask them during your visits.

## 2021-10-20 VITALS
DIASTOLIC BLOOD PRESSURE: 69 MMHG | SYSTOLIC BLOOD PRESSURE: 102 MMHG | HEART RATE: 62 BPM | RESPIRATION RATE: 16 BRPM | OXYGEN SATURATION: 99 % | TEMPERATURE: 98 F

## 2021-10-20 PROCEDURE — 87635 SARS-COV-2 COVID-19 AMP PRB: CPT

## 2021-10-20 PROCEDURE — 81001 URINALYSIS AUTO W/SCOPE: CPT

## 2021-10-20 PROCEDURE — G1004: CPT

## 2021-10-20 PROCEDURE — 96376 TX/PRO/DX INJ SAME DRUG ADON: CPT

## 2021-10-20 PROCEDURE — 85025 COMPLETE CBC W/AUTO DIFF WBC: CPT

## 2021-10-20 PROCEDURE — 76830 TRANSVAGINAL US NON-OB: CPT

## 2021-10-20 PROCEDURE — 74177 CT ABD & PELVIS W/CONTRAST: CPT | Mod: 26,ME

## 2021-10-20 PROCEDURE — 96374 THER/PROPH/DIAG INJ IV PUSH: CPT | Mod: XU

## 2021-10-20 PROCEDURE — 80053 COMPREHEN METABOLIC PANEL: CPT

## 2021-10-20 PROCEDURE — 85610 PROTHROMBIN TIME: CPT

## 2021-10-20 PROCEDURE — 96375 TX/PRO/DX INJ NEW DRUG ADDON: CPT

## 2021-10-20 PROCEDURE — 74177 CT ABD & PELVIS W/CONTRAST: CPT | Mod: ME

## 2021-10-20 PROCEDURE — 99284 EMERGENCY DEPT VISIT MOD MDM: CPT | Mod: 25

## 2021-10-20 PROCEDURE — 76856 US EXAM PELVIC COMPLETE: CPT

## 2021-10-20 PROCEDURE — 81025 URINE PREGNANCY TEST: CPT

## 2021-10-20 PROCEDURE — 85730 THROMBOPLASTIN TIME PARTIAL: CPT

## 2021-10-20 PROCEDURE — 36415 COLL VENOUS BLD VENIPUNCTURE: CPT

## 2021-10-20 RX ORDER — HYDROMORPHONE HYDROCHLORIDE 2 MG/ML
0.5 INJECTION INTRAMUSCULAR; INTRAVENOUS; SUBCUTANEOUS ONCE
Refills: 0 | Status: DISCONTINUED | OUTPATIENT
Start: 2021-10-20 | End: 2021-10-20

## 2021-10-20 RX ADMIN — HYDROMORPHONE HYDROCHLORIDE 0.5 MILLIGRAM(S): 2 INJECTION INTRAMUSCULAR; INTRAVENOUS; SUBCUTANEOUS at 01:22

## 2021-10-20 RX ADMIN — HYDROMORPHONE HYDROCHLORIDE 0.5 MILLIGRAM(S): 2 INJECTION INTRAMUSCULAR; INTRAVENOUS; SUBCUTANEOUS at 04:20

## 2021-10-20 RX ADMIN — HYDROMORPHONE HYDROCHLORIDE 0.5 MILLIGRAM(S): 2 INJECTION INTRAMUSCULAR; INTRAVENOUS; SUBCUTANEOUS at 01:52

## 2021-10-20 RX ADMIN — HYDROMORPHONE HYDROCHLORIDE 0.5 MILLIGRAM(S): 2 INJECTION INTRAMUSCULAR; INTRAVENOUS; SUBCUTANEOUS at 04:05

## 2021-10-20 RX ADMIN — MORPHINE SULFATE 4 MILLIGRAM(S): 50 CAPSULE, EXTENDED RELEASE ORAL at 00:08

## 2021-10-20 RX ADMIN — SODIUM CHLORIDE 1000 MILLILITER(S): 9 INJECTION INTRAMUSCULAR; INTRAVENOUS; SUBCUTANEOUS at 01:00

## 2022-01-03 ENCOUNTER — RESULT REVIEW (OUTPATIENT)
Age: 32
End: 2022-01-03

## 2022-07-14 ENCOUNTER — NON-APPOINTMENT (OUTPATIENT)
Age: 32
End: 2022-07-14

## 2024-05-07 NOTE — ED ADULT NURSE NOTE - PAIN RATING/NUMBER SCALE (0-10): REST
[FreeTextEntry1] : I discussed the findings of the sonogram at length with the patient.  I discussed in general reassuring ultrasound except for the small amount of fluid in the endometrial canal.  Patient denies any vaginal bleeding.  We discussed low risk associated with this.  I offered her to repeat sonogram in 3 months to see if the fluid is persistent or consider an endometrial biopsy.  She will return in 3 months for repeat sonogram.  All questions were answered.
8